# Patient Record
Sex: MALE | Race: OTHER | HISPANIC OR LATINO | URBAN - METROPOLITAN AREA
[De-identification: names, ages, dates, MRNs, and addresses within clinical notes are randomized per-mention and may not be internally consistent; named-entity substitution may affect disease eponyms.]

---

## 2024-09-27 ENCOUNTER — EMERGENCY (EMERGENCY)
Facility: HOSPITAL | Age: 34
LOS: 1 days | Discharge: HOME | End: 2024-09-27
Attending: STUDENT IN AN ORGANIZED HEALTH CARE EDUCATION/TRAINING PROGRAM | Admitting: STUDENT IN AN ORGANIZED HEALTH CARE EDUCATION/TRAINING PROGRAM
Payer: SELF-PAY

## 2024-09-27 VITALS
TEMPERATURE: 98 F | HEIGHT: 67.72 IN | SYSTOLIC BLOOD PRESSURE: 144 MMHG | RESPIRATION RATE: 16 BRPM | HEART RATE: 81 BPM | DIASTOLIC BLOOD PRESSURE: 80 MMHG | OXYGEN SATURATION: 99 % | WEIGHT: 171.96 LBS

## 2024-09-27 PROCEDURE — 99283 EMERGENCY DEPT VISIT LOW MDM: CPT

## 2024-09-27 PROCEDURE — 99284 EMERGENCY DEPT VISIT MOD MDM: CPT

## 2024-09-27 RX ORDER — CLINDAMYCIN PHOSPHATE 150 MG/ML
3 VIAL (ML) INJECTION
Qty: 63 | Refills: 0
Start: 2024-09-27 | End: 2024-10-03

## 2024-09-27 RX ORDER — BACITRACIN 500 UNIT/G
1 OINTMENT (GRAM) TOPICAL
Qty: 1 | Refills: 1
Start: 2024-09-27 | End: 2024-10-24

## 2024-09-27 RX ORDER — BACITRACIN 500 UNIT/G
1 OINTMENT (GRAM) TOPICAL ONCE
Refills: 0 | Status: COMPLETED | OUTPATIENT
Start: 2024-09-27 | End: 2024-09-27

## 2024-09-27 RX ORDER — CLINDAMYCIN PHOSPHATE 150 MG/ML
450 VIAL (ML) INJECTION ONCE
Refills: 0 | Status: COMPLETED | OUTPATIENT
Start: 2024-09-27 | End: 2024-09-27

## 2024-09-27 RX ADMIN — Medication 450 MILLIGRAM(S): at 09:39

## 2024-09-27 RX ADMIN — Medication 1 APPLICATION(S): at 09:42

## 2024-09-27 NOTE — ED PROVIDER NOTE - PATIENT PORTAL LINK FT
You can access the FollowMyHealth Patient Portal offered by [facility] by registering at the following website: [email or web address]. By joining [employer] [facility]’s FollowMyHealth portal, you will also be able to view your health information using other applications (apps) compatible with our system.

## 2024-09-27 NOTE — ED PROVIDER NOTE - NSFOLLOWUPINSTRUCTIONS_ED_ALL_ED_FT
Descanse, anatoliy muchos líquidos.  Avanzar en la actividad según se tolere.  Continúe con todos los medicamentos recetados anteriormente según las indicaciones.  Armando un seguimiento con macdonald PMD 2-3 días y traiga copias de qasim resultados.  Regrese a la gianluca de emergencias si los síntomas empeoran, fiebre, temi leonard, aumento de la hinchazón, decoloración de la mano, entumecimiento u hormigueo de la mano, debilidad en la mano o nuevos síntomas preocupantes.      Seguimiento en urgencias o urgencias en 48 horas para revisión de heridas.      Continúe aplicando ungüento antibiótico de bacitracina dos veces al día en la herida y cúbrala con un apósito limpio y seco hasta que desaparezca.      Rest, drink plenty of fluids.  Advance activity as tolerated.  Continue all previously prescribed medications as directed.  Follow up with your PMD 2-3 days and bring copies of your results.  Return to the ER for worsening symptoms,   Fevers, red streaking, increased swelling, discoloration of the hand, numbness or tingling of the hand, weakness in the hand, or new concerning symptoms.      Follow-up in the emergency room or urgent care in 48 hours for wound check.      Continue to place bacitracin antibiotic ointment twice daily  to the wound and cover with a clean dry dressing until resolution.
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
## 2024-09-27 NOTE — ED PROVIDER NOTE - PHYSICAL EXAMINATION
VITAL SIGNS: I have reviewed nursing notes and confirm.   GEN: Well-developed; well-nourished; in no acute distress. Speaking full sentences.  SKIN: Warm, pink, no rash, no diaphoresis, no cyanosis, well perfused.   HEAD: Normocephalic; atraumatic.    NECK: Supple; non tender.   EYES: Pupils 3mm equal, round, reactive to light and accomodation, conjunctiva and sclera clear. Extra-ocular movements intact bilaterally.  MSK: Normal range of motion and movement of all 4 extremities. No apparent joint or muscular pain throughout.  (+) RIGHT forarm volar aspect 4x4cm circular lesion with granulation tissue. No red streaking or surrounding erythema. Mild swelling. Compartments soft. No discoloration. Cap refill < 2 sec distally and distal pulse 2+  BACK: No thoracolumbar midline or paravertebral tenderness.    NEURO: Alert & oriented x 3, Grossly unremarkable. Sensory and motor intact throughout. No focal deficits. Gait: Fluid. Normal speech and coordination.